# Patient Record
Sex: FEMALE | Race: WHITE | URBAN - METROPOLITAN AREA
[De-identification: names, ages, dates, MRNs, and addresses within clinical notes are randomized per-mention and may not be internally consistent; named-entity substitution may affect disease eponyms.]

---

## 2018-11-09 ENCOUNTER — TELEPHONE (OUTPATIENT)
Dept: SURGERY | Age: 63
End: 2018-11-09

## 2018-11-09 NOTE — TELEPHONE ENCOUNTER
1st call -- LVM to schedule appointment with Dr. Kwaku Starr. Fax referral from [facility]. Evaluate and treat family history of breast cancer and BRCA1 gene pos in uncle. Unclear what testing is indicated at this point.

## 2018-12-19 ENCOUNTER — TELEPHONE (OUTPATIENT)
Dept: SURGERY | Age: 63
End: 2018-12-19

## 2018-12-20 ENCOUNTER — OFFICE VISIT (OUTPATIENT)
Dept: SURGERY | Age: 63
End: 2018-12-20

## 2018-12-20 VITALS — HEART RATE: 69 BPM | TEMPERATURE: 96.8 F | SYSTOLIC BLOOD PRESSURE: 141 MMHG | DIASTOLIC BLOOD PRESSURE: 93 MMHG

## 2018-12-20 DIAGNOSIS — Z80.3 FAMILY HISTORY OF BREAST CANCER: Primary | ICD-10-CM

## 2018-12-20 RX ORDER — HYDROCODONE BITARTRATE AND ACETAMINOPHEN 7.5; 325 MG/1; MG/1
TABLET ORAL
COMMUNITY

## 2018-12-20 RX ORDER — DICLOFENAC SODIUM 10 MG/G
GEL TOPICAL 4 TIMES DAILY
COMMUNITY

## 2018-12-20 RX ORDER — OMEGA-3-ACID ETHYL ESTERS 1 G/1
2 CAPSULE, LIQUID FILLED ORAL 2 TIMES DAILY
COMMUNITY

## 2018-12-20 RX ORDER — ERGOCALCIFEROL 1.25 MG/1
50000 CAPSULE ORAL
COMMUNITY

## 2018-12-20 NOTE — LETTER
12/21/2018 9:24 AM 
 
Patient:  Grover Cash YOB: 1955 Date of Visit: 12/20/2018 Kimi Eid MD 
[address] [place] [id number] VIA Facsimile: [phone] Dear Kimi Eid MD, 
 
 
I had the pleasure of seeing Ms. Giorgio Rajan in my office today for information regarding her risk of breast cancer. I am including a copy of my office visit today. If you have questions, please do not hesitate to call me. I look forward to following Ms. Katelyn Dupont along with you and will keep you updated as to her progress. Sincerely, Franck Darnell MD

## 2018-12-20 NOTE — PROGRESS NOTES
Chief Complaint   Patient presents with    Advice Only     Consult in regards to BRCA testing d/t uncle having breast cancer, cancer in 5 lymph nodes currently 69yo. 1. Have you been to the ER, urgent care clinic since your last visit? Hospitalized since your last visit? No    2. Have you seen or consulted any other health care providers outside of the [address] [place] [address] since your last visit? Include any pap smears or colon screening.  No

## 2018-12-20 NOTE — PROGRESS NOTES
Breast Mass Consult      Ms. Jd Chapa is a 70year old woman with family history of breast cancer. Her maternal uncle was recently diagnosed with  breast cancer in [facility]. She reports his genetic testing is pending. She is concerned about risk of cancer for her, her sisters and her uncle's daughter. She report getting annual mammograms at [address] [place] [address]. She is without complaint related to her breasts currently. Her maternal grandmother had breast cancer in her [de-identified]. Breast/GYN history:    Past Medical History:   Diagnosis Date    Arthritis, rheumatoid ([facility] [address]     Fibromyalgia     Stroke [facility])        Past Surgical History:   Procedure Laterality Date    HX HYSTERECTOMY         Current Outpatient Medications on File Prior to Visit   Medication Sig Dispense Refill    trazodone HCl (TRAZODONE PO) Take  by mouth.  ergocalciferol (ERGOCALCIFEROL) 50,000 unit capsule Take 50,000 Units by mouth.  omega-3 acid ethyl esters (LOVAZA) 1 gram capsule Take 2 g by mouth two (2) times a day.  tizanidine HCl (ZANAFLEX PO) Take  by mouth.  HYDROcodone-acetaminophen (NORCO) 7.5-325 mg per tablet Take  by mouth.  diclofenac (VOLTAREN) 1 % gel Apply  to affected area four (4) times daily.  phentermine/topiramate (QSYMIA PO) Take  by mouth. No current facility-administered medications on file prior to visit.         Allergies   Allergen Reactions    Tetracyclines Anaphylaxis    Ibuprofen Hives       Social History     Tobacco Use    Smoking status: Never Smoker    Smokeless tobacco: Never Used   Substance Use Topics    Alcohol use: Not on file    Drug use: Not on file       Family History   Problem Relation Age of Onset    Cancer Maternal Uncle     No Known Problems Mother         lana cedenoloma -pre         ROS:   Positives are bolded  General: fevers, chills, night sweats, fatigue, weight loss, weight gain  GI: nausea, vomiting, abdominal pain, change in bowel habits, hematochezia, melena, GERD  Integ: dermatitis, abnormal moles  HEENT: visual changes, vertigo, epistaxis, dysphagia, hoarseness  Cardiac: chest pain, palpitations, HTN, edema, varicosities  Resp: cough, shortness of breath, wheezing, hemoptysis, snoring, reactive airway disease  : hematuria, dysuria, frequency, urgency, nocturia, stress urinary incontinence   MSK: weakness, joint pain, arthritis  Endocrine: diabetes, thyroid disease, polyuria, polydipsia, polyphagia, poor wound healing, heat intolerance, cold intolerance  Lymph/Heme: anemia, bruising, history of blood transfusions  Neuro: dizziness, headache, fainting, seizures, stroke  Psych: anxiety, depression    Physical Exam:  Visit Vitals  BP (!) 141/93 (BP 1 Location: Right arm, BP Patient Position: Sitting)   Pulse 69   Temp 96.8 °F (36 °C) (Oral)       Gen: No distress  Head: Normocephalic, atraumatic  Mouth: Clear, no overt lesions, oral mucosa pink and moist  Neck: trachea midline  Resp: no respiratory distress  Cardio: adequate perfusion  Abdomen: soft, nontender, nondistended  Extremeties: warm, well-perfused  Neuro: no movement abnormalities noted  Psych: alert and oriented to person, place and time  Breasts: patient declined      Imaging:  Per patient November 2018 at [facility] negative     Impression:  Patient Active Problem List   Diagnosis Code    Family history of breast cancer Z80.3        70year old woman with family history of breast cancer. Her maternal uncle was recently diagnosed with breast cancer. His genetic testing is currently pending. We discussed genetic testing in detail. Ideally an individual with cancer is tested first as it can otherwise be difficult to interpret a negative result. If the family member with cancer has a known genetic mutation, other family members should consider testing.   I recommend testing for those who are over the age of 25 and interested in knowing the results (ie would consider risk reducing procedures in the event of a positive result). Health insurance should not be significantly affected by the results, though life and disability insurance may be. Often these would be affected by a kala with breast cancer or genetic mutation diagnosis, but it may be more difficult to obtain with a known personal genetic mutation. Those pursuing genetic testing may consider obtaining or maximizing benefits prior to proceeding with genetic testing. Usually it is best to start with first degree family members and extend from there, understanding sometimes first degree relatives are unable or unwilling to undergo testing. Many genetic testing companies will pay for first degree and sometimes extended relatives to be tested for the specific gene with the known abnormality. In the event the affected family member returns as negative for known mutation, there is no reason to proceed with genetic testing on unaffected family members. For Ms. Ines Bhat, I have recommended waiting for the results of her uncle's testing. In the event of a negative result, I do not recommend testing on additional family members. In the event of a known mutation, ideally the uncle's daughter and his sister (Ms. Haleigh Holman mother) should undergo testing next. If Ms. Ennis's mother is negative, neither Ms. Ines Bhat nor her sisters would need genetic testing. If Ms. Ennis's mother were to test positive, I would recommend genetic testing for Ms. Ines Bhat and her sisters. Ms. Ines Bhat verbalized understanding and will inform us as to her uncle's results once they return. Pending any additional information I have recommended annual clinical breast exam and annual mammography for Ms. Ines Bhat. She was asked to call with any questions or concerns.

## 2018-12-21 PROBLEM — Z80.3 FAMILY HISTORY OF BREAST CANCER: Status: ACTIVE | Noted: 2018-12-21
